# Patient Record
Sex: FEMALE | Race: WHITE | NOT HISPANIC OR LATINO | URBAN - METROPOLITAN AREA
[De-identification: names, ages, dates, MRNs, and addresses within clinical notes are randomized per-mention and may not be internally consistent; named-entity substitution may affect disease eponyms.]

---

## 2018-07-09 ENCOUNTER — EMERGENCY (EMERGENCY)
Facility: HOSPITAL | Age: 28
LOS: 0 days | Discharge: HOME | End: 2018-07-10
Attending: EMERGENCY MEDICINE | Admitting: EMERGENCY MEDICINE
Payer: OTHER MISCELLANEOUS

## 2018-07-09 VITALS
OXYGEN SATURATION: 100 % | DIASTOLIC BLOOD PRESSURE: 103 MMHG | HEART RATE: 113 BPM | WEIGHT: 134.92 LBS | SYSTOLIC BLOOD PRESSURE: 137 MMHG | RESPIRATION RATE: 17 BRPM | TEMPERATURE: 98 F | HEIGHT: 62 IN

## 2018-07-09 DIAGNOSIS — Z23 ENCOUNTER FOR IMMUNIZATION: ICD-10-CM
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
## 2018-07-09 DIAGNOSIS — S61.230A PUNCTURE WOUND WITHOUT FOREIGN BODY OF RIGHT INDEX FINGER WITHOUT DAMAGE TO NAIL, INITIAL ENCOUNTER: ICD-10-CM
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
## 2018-07-09 PROCEDURE — 99284 EMERGENCY DEPT VISIT MOD MDM: CPT

## 2018-07-09 RX ORDER — RABIES VACC, HUMAN DIPLOID/PF 2.5 UNIT
1 VIAL (EA) INTRAMUSCULAR ONCE
Qty: 0 | Refills: 0 | Status: COMPLETED | OUTPATIENT
Start: 2018-07-09 | End: 2018-07-09

## 2018-07-09 RX ORDER — HUMAN RABIES VIRUS IMMUNE GLOBULIN 150 [IU]/ML
1200 INJECTION, SOLUTION INTRAMUSCULAR ONCE
Qty: 0 | Refills: 0 | Status: COMPLETED | OUTPATIENT
Start: 2018-07-09 | End: 2018-07-09

## 2018-07-09 RX ADMIN — HUMAN RABIES VIRUS IMMUNE GLOBULIN 1200 UNIT(S): 150 INJECTION, SOLUTION INTRAMUSCULAR at 22:56

## 2018-07-09 RX ADMIN — Medication 1 MILLILITER(S): at 22:50

## 2018-07-09 NOTE — ED PROVIDER NOTE - OBJECTIVE STATEMENT
28 y/o F presents to the ED c/o numbness to R second digit near puncture wound s/p R bite by a Pitbull dog. Pt with no confirmation of dog rabies prophylaxis. Patient works at a [employer] and was bit by a Pitbull on 7/4/18. Two days later saw MD at  who prescribed ABx and D/C. Pt currently on ABx. Today patient took her band aid off and observed numbness. Denies fever, chills, sweats. Brother at bedside. No other neurological findings.

## 2018-07-09 NOTE — ED PROVIDER NOTE - PROGRESS NOTE DETAILS
JW PT given rabies prophylaxis and is UTD on tetanus. Pt instructed to continue ABX no sign of infection today. Pt given instructions on additional prophylaxis and instructed to follow up with hand surgeon in the next 24 hours.  I reviewed the alarm symptoms of this patient's diagnosis and discussed criteria for their return to the emergency department.  I instructed the patient to return to the emergency department with any alarm symptoms for their specific diagnosis including weakness, numbness, vision changes, neurological symptoms, any worsening symptoms, and any other concerns.  I instructed this patient to call their primary doctor today, to inform them of their visit to the emergency department, and to obtain a repeat evaluation in the next 24 hours.  This patient understood and agreed with our plan for follow up.  At the time of discharge this patient remained in stable condition, in no acute distress, with stable vital signs. JW Case reported to [facility]. JW PT has unreliable follow up questionable PMD primary medical home is urgent care or ED.  Unknown dog, unclear vaccination status, questionable availability of medical records.  Pt presents with hand numbness near the area of injury likely local nerve damage.  No snuff box tenderness, full ROM no TTP.  Neurovascularly intact.  No signs of infection SIRS 0+.  PT given rabies prophylaxis and is UTD on tetanus. Pt instructed to continue ABX no sign of infection today. Pt given instructions on additional prophylaxis and instructed to follow up with hand surgeon in the next 24 hours.  I reviewed the alarm symptoms of this patient's diagnosis and discussed criteria for their return to the emergency department.  I instructed the patient to return to the emergency department with any alarm symptoms for their specific diagnosis including weakness, numbness, vision changes, neurological symptoms, any worsening symptoms, and any other concerns.  I instructed this patient to call their primary doctor today, to inform them of their visit to the emergency department, and to obtain a repeat evaluation in the next 24 hours.  This patient understood and agreed with our plan for follow up.  At the time of discharge this patient remained in stable condition, in no acute distress, with stable vital signs. JW Case reported to [facility] who will follow up.

## 2018-07-09 NOTE — ED ADULT NURSE NOTE - OBJECTIVE STATEMENT
Pt states x 1 week ago was bitten by dog at work.  Pt states "I was told the dog had his rabies vaccine".  Pt does not have documentation at this time.  Pt states she went to urgent care x days ago and was told "wound looks fine" and given antibiotics and sent home, was not given rabies vaccine.  Pt states today developed numbness to hand.  States "I cannot feel anything from pointer finger to thumb".  VSS, no other symptoms

## 2018-07-09 NOTE — ED ADULT NURSE NOTE - CHPI ED SYMPTOMS NEG
no bleeding at site/no pain/no chills/no red streaks/no bleeding/no purulent drainage/no redness/no vomiting/no fever/no drainage

## 2018-07-09 NOTE — ED PROVIDER NOTE - PHYSICAL EXAMINATION
Left hand: 1cm well healed puncture wound above the 2nd carpal bone.  Mild numbness 1cm immediately distal to this wound.  No snuff box tenderness.  No erythema, drainage, gas, crepitus.  Full ROM of all 5 digits.  Otherwise normal sensory and motor function. 2+radial pulses with excellent capillary refill.

## 2018-07-09 NOTE — ED PROVIDER NOTE - NS_ ATTENDINGSCRIBEDETAILS _ED_A_ED_FT
The scribe's documentation has been prepared under my direction and personally reviewed by me in its entirety.  I confirm that the note above accurately reflects all my work, treatment, procedures, and decision making except where otherwise noted or amended by me.  Migel Crandall M.D.

## 2018-07-09 NOTE — ED PROVIDER NOTE - NEUROLOGICAL, MLM
Alert and oriented, no focal deficits, no motor or sensory deficits. No other neurological findings.

## 2018-07-09 NOTE — ED PROVIDER NOTE - MEDICAL DECISION MAKING DETAILS
28 y/o F presents to the ED c/o numbness to R second digit near puncture wound s/p R bite by a Pitbull dog plan for vaccine, rabies immune globulin update.

## 2018-07-16 ENCOUNTER — EMERGENCY (EMERGENCY)
Facility: HOSPITAL | Age: 28
LOS: 0 days | Discharge: HOME | End: 2018-07-16
Attending: EMERGENCY MEDICINE | Admitting: EMERGENCY MEDICINE
Payer: OTHER MISCELLANEOUS

## 2018-07-16 VITALS
OXYGEN SATURATION: 99 % | TEMPERATURE: 98 F | DIASTOLIC BLOOD PRESSURE: 81 MMHG | SYSTOLIC BLOOD PRESSURE: 138 MMHG | RESPIRATION RATE: 16 BRPM | HEART RATE: 90 BPM

## 2018-07-16 VITALS
HEART RATE: 90 BPM | OXYGEN SATURATION: 100 % | TEMPERATURE: 98 F | HEIGHT: 67 IN | SYSTOLIC BLOOD PRESSURE: 141 MMHG | WEIGHT: 130.07 LBS | RESPIRATION RATE: 18 BRPM | DIASTOLIC BLOOD PRESSURE: 85 MMHG

## 2018-07-16 DIAGNOSIS — S61.451D OPEN BITE OF RIGHT HAND, SUBSEQUENT ENCOUNTER: ICD-10-CM
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
## 2018-07-16 PROCEDURE — 99283 EMERGENCY DEPT VISIT LOW MDM: CPT

## 2018-07-16 RX ORDER — RABIES VACC, HUMAN DIPLOID/PF 2.5 UNIT
1 VIAL (EA) INTRAMUSCULAR ONCE
Qty: 0 | Refills: 0 | Status: DISCONTINUED | OUTPATIENT
Start: 2018-07-16 | End: 2018-07-16

## 2018-07-16 NOTE — ED ADULT NURSE NOTE - CHIEF COMPLAINT QUOTE
Patient was seen in ED 6 days ago status post a dog bite to her right hand. Received 1st dose rabies immunoglobulin and vaccine in [facility] then. Patient returning to ED for follow up rabies shot.

## 2018-07-16 NOTE — ED PROVIDER NOTE - OBJECTIVE STATEMENT
Pt. is a 26 yo F s/p 1st dose of rabies vaccine 6 days ago after exposure to dog bite now presenting for next shot.  Pt. unsure if she wants shot because now she has animal's immunizations records and dog was up to date on rabies vaccine.  Pt. does not wish to continue with vaccine series if there is little to no risk.  Pt. has dog bite on right hand, finished antibiotics for dog bite, and has appointment with Dr. Antonio this week as she still has some residual pain and numbness in right hand. No fevers per patient and wound is almost completely healed.

## 2018-07-16 NOTE — ED ADULT TRIAGE NOTE - CHIEF COMPLAINT QUOTE
Patient was seen in ED 6 days ago status post a dog bite to her right hand. Received 1st dose rabies immunoglobulin and vaccine in [facility] then. Patient returning to ED for follow up rabies shot.

## 2018-07-16 NOTE — ED ADULT NURSE NOTE - OBJECTIVE STATEMENT
presents to the ED for repeat rabies vaccination. as per pt, she was bit by a dog and got treated 1 week ago. has now returned for repeat vaccination as instructed.

## 2018-07-16 NOTE — ED PROVIDER NOTE - MEDICAL DECISION MAKING DETAILS
Dog bite to hand; already treated with antibiotics and has appt with Dr. Antonio for sensation deficit already diagnosed.  Patient chooses to stop vaccine series for rabies as she now has documentation of dog's vaccines which are up to date.

## 2018-08-03 ENCOUNTER — EMERGENCY (EMERGENCY)
Facility: HOSPITAL | Age: 28
LOS: 0 days | Discharge: HOME | End: 2018-08-03
Attending: EMERGENCY MEDICINE | Admitting: EMERGENCY MEDICINE
Payer: OTHER MISCELLANEOUS

## 2018-08-03 VITALS
HEART RATE: 87 BPM | OXYGEN SATURATION: 100 % | RESPIRATION RATE: 17 BRPM | DIASTOLIC BLOOD PRESSURE: 76 MMHG | TEMPERATURE: 98 F | SYSTOLIC BLOOD PRESSURE: 118 MMHG

## 2018-08-03 VITALS — HEIGHT: 62 IN | WEIGHT: 130.07 LBS

## 2018-08-03 DIAGNOSIS — Z20.3 CONTACT WITH AND (SUSPECTED) EXPOSURE TO RABIES: ICD-10-CM

## 2018-08-03 PROCEDURE — 99283 EMERGENCY DEPT VISIT LOW MDM: CPT

## 2018-08-03 NOTE — ED STATDOCS - OBJECTIVE STATEMENT
26 y/o female with no relevant PMHx presents to the ED regarding rabies follow up. Pt was bit by a dos on 7/4/2018, received rabies shot 7/9/2018. Pt returned to [facility] for second rabies shot, but she learned that the dog was vaccinated for rabies, so she did not receive the second shot. The Department of Health has been calling to make sure that the pt received her shots, but she was advised that she did not need the rabies series. However the Department of Health states she needs the full rabies series. No fever, abd pain or any other acute complaints at this time.

## 2018-08-03 NOTE — ED STATDOCS - ATTENDING CONTRIBUTION TO CARE
I, Israel Tay MD,  performed the initial face to face bedside interview with this patient regarding history of present illness, review of symptoms and relevant past medical, social and family history.  I completed an independent physical examination.  I was the initial provider who evaluated this patient. I have signed out the follow up of any pending tests (i.e. labs, radiological studies) to the ACP.  I have communicated the patient’s plan of care and disposition with the ACP.

## 2018-08-03 NOTE — ED STATDOCS - PROGRESS NOTE DETAILS
28 yo female presents with rabies f/u. Pt was initially bit by a dog on 7/3 and reecived a rabies vaccine on 7/9. Pt returned for her rabies vaccine but had paperwork that the dog was vaccinated for rabies and was told she did not need the rest. Pt later called by [facility] and was told that she needed the vaccines. Will contact [facility] and discuss the case. -Joshua Becker PA-C [facility] [employer] called and will pass the call to the [facility] since it is after hours. -Joshua Becker PA-C Tried calling [facility] [employer] again but the line was busy. -Joshua Becker PA-C Spoke with Zenaida Junior from [facility]. States as long as she has the paperwork that the dog had the rabies vaccine then she would not need to get the series. Will fax the paperwork to [phone]. Pt was advised of the [facility] recommendation. -Joshua Becker PA-C Spoke with Zenaida Junior from [facility]. States as long as she has the paperwork that the dog had the rabies vaccine then she would not need to get the series. Based on [facility] opt received te vaccine on 6/18/18. Will fax the paperwork to [phone]. Pt was advised of the [facility] recommendation. -Joshua Becker PA-C

## 2018-08-03 NOTE — ED ADULT TRIAGE NOTE - CHIEF COMPLAINT QUOTE
got bit by a dog at work on 7/4/18, came in for evaluation on 7/9/18 and received Rabies shot. Pt presents to ED requesting for follow up rabies shot.

## 2018-08-03 NOTE — ED ADULT NURSE NOTE - NSIMPLEMENTINTERV_GEN_ALL_ED
Implemented All Universal Safety Interventions:  [place] to call system. Call bell, personal items and telephone within reach. Instruct patient to call for assistance. Room bathroom lighting operational. Non-slip footwear when patient is off stretcher. Physically safe environment: no spills, clutter or unnecessary equipment. Stretcher in lowest position, wheels locked, appropriate side rails in place.

## 2018-08-03 NOTE — ED ADULT NURSE REASSESSMENT NOTE - NS ED NURSE REASSESS COMMENT FT1
Updated patient to plan of care. Awaiting return call from [facility] to see if patient needs rabies vaccination.

## 2018-08-03 NOTE — ED STATDOCS - NS_ ATTENDINGSCRIBEDETAILS _ED_A_ED_FT
I, Israel Tay MD,  performed the initial face to face bedside interview with this patient regarding history of present illness, review of symptoms and relevant past medical, social and family history.  I completed an independent physical examination.    The history, relevant review of systems, past medical and surgical history, medical decision making, and physical examination was documented by the scribe in my presence and I attest to the accuracy of the documentation.

## 2018-08-03 NOTE — ED ADULT NURSE NOTE - OBJECTIVE STATEMENT
pt states bit by vaccinated dog (within the last week) to right hand, occurred on July 4th, pt states she started rabies vaccine on July 9th here at [place], c/o numbness to right index finger, has been seeing a hand specialist, pt states dept of health of [facility] states she needed to complete vaccine

## 2018-08-07 LAB — RABV AB SPEC-ACNC: SIGNIFICANT CHANGE UP IU/ML

## 2018-12-11 NOTE — ED PROVIDER NOTE - NEUROLOGICAL SENSATION
Reminded pt that we need a stool sample at this time.      Damon Peterson  12/10/18 1920    
Report to DYLON Mccarty, pt resting quietly, visiting with  at bedside, iv infusing without any difficulty at this time, pt ask about diabetes, states she was diagnosed years ago but chose to stop taking po med for it because she didn't want to go to dr's anymore, pt is noncompliant with diet, just finished eating a jacob's bar and states she does not follow a diabetic diet, provider notified as well, pt alert, oriented, nad noted at this time     Callie Vides RN  12/10/18 1944    
present and normal in 4 extremities

## 2020-04-26 ENCOUNTER — MESSAGE (OUTPATIENT)
Age: 30
End: 2020-04-26

## 2020-05-02 LAB
SARS-COV-2 IGG SERPL IA-ACNC: <0.1 INDEX
SARS-COV-2 IGG SERPL QL IA: NEGATIVE

## 2021-11-29 NOTE — ED ADULT NURSE NOTE - CINV DISCH TEACH PARTICIP
Jose presents today for her OB visit. She reports dizziness x4-5 days. She also reports lower back pain.    Patient would like communication of their results via: [employer]    Patient's current [facility] status: Active.     [facility] - Patient is on [employer] Scheduling.           Patient

## 2022-01-06 ENCOUNTER — OUTPATIENT (OUTPATIENT)
Dept: OUTPATIENT SERVICES | Facility: HOSPITAL | Age: 32
LOS: 1 days | End: 2022-01-06
Payer: COMMERCIAL

## 2022-01-06 DIAGNOSIS — Z20.828 CONTACT WITH AND (SUSPECTED) EXPOSURE TO OTHER VIRAL COMMUNICABLE DISEASES: ICD-10-CM

## 2022-01-06 PROCEDURE — U0005: CPT

## 2022-01-06 PROCEDURE — U0003: CPT

## 2022-01-07 DIAGNOSIS — Z20.828 CONTACT WITH AND (SUSPECTED) EXPOSURE TO OTHER VIRAL COMMUNICABLE DISEASES: ICD-10-CM

## 2023-01-11 NOTE — ED STATDOCS - DATE/TIME 4
Addended by: NAKITA DOWLING on: 1/11/2023 01:13 PM     Modules accepted: Orders     03-Aug-2018 23:05

## 2023-04-12 ENCOUNTER — NON-APPOINTMENT (OUTPATIENT)
Age: 33
End: 2023-04-12

## 2023-08-11 NOTE — ED ADULT TRIAGE NOTE - WEIGHT IN KG
59 Transposition Flap Text: The defect edges were debeveled with a #15 scalpel blade.  Given the location of the defect and the proximity to free margins a transposition flap was deemed most appropriate.  Using a sterile surgical marker, an appropriate transposition flap was drawn incorporating the defect.    The area thus outlined was incised deep to adipose tissue with a #15 scalpel blade.  The skin margins were undermined to an appropriate distance in all directions utilizing iris scissors.

## 2024-06-07 PROBLEM — Z00.00 ENCOUNTER FOR PREVENTIVE HEALTH EXAMINATION: Status: ACTIVE | Noted: 2024-06-07

## 2024-06-12 ENCOUNTER — APPOINTMENT (OUTPATIENT)
Dept: INTERNAL MEDICINE | Facility: CLINIC | Age: 34
End: 2024-06-12
Payer: SELF-PAY

## 2024-06-12 VITALS
TEMPERATURE: 97.8 F | DIASTOLIC BLOOD PRESSURE: 89 MMHG | OXYGEN SATURATION: 96 % | BODY MASS INDEX: 24.92 KG/M2 | SYSTOLIC BLOOD PRESSURE: 125 MMHG | HEIGHT: 61 IN | RESPIRATION RATE: 16 BRPM | WEIGHT: 132 LBS | HEART RATE: 96 BPM

## 2024-06-12 DIAGNOSIS — Z23 ENCOUNTER FOR IMMUNIZATION: ICD-10-CM

## 2024-06-12 DIAGNOSIS — Z13.31 ENCOUNTER FOR SCREENING FOR DEPRESSION: ICD-10-CM

## 2024-06-12 DIAGNOSIS — Z00.00 ENCOUNTER FOR GENERAL ADULT MEDICAL EXAMINATION W/OUT ABNORMAL FINDINGS: ICD-10-CM

## 2024-06-12 DIAGNOSIS — R01.1 CARDIAC MURMUR, UNSPECIFIED: ICD-10-CM

## 2024-06-12 PROCEDURE — 99385 PREV VISIT NEW AGE 18-39: CPT | Mod: 25

## 2024-06-12 PROCEDURE — G0444 DEPRESSION SCREEN ANNUAL: CPT | Mod: 59

## 2024-06-12 PROCEDURE — 90471 IMMUNIZATION ADMIN: CPT

## 2024-06-12 PROCEDURE — 36415 COLL VENOUS BLD VENIPUNCTURE: CPT

## 2024-06-12 PROCEDURE — 90715 TDAP VACCINE 7 YRS/> IM: CPT

## 2024-06-12 NOTE — HISTORY OF PRESENT ILLNESS
[FreeTextEntry1] : establish care wellness exam [de-identified] : Ms. DRU AUSTIN is a 33 year female Pt. have no known medical history. Pt. is overall feeling well. No change in weight. No change in bowel and bladder habits. No trouble sleeping at night.

## 2024-06-12 NOTE — ASSESSMENT
[FreeTextEntry1] : Annual: Ordered comprehensive blood work , screen for hyperlipidemia , diabetes and thyroid disorder. labs drawn in the office. The results will be reviewed, and any abnormalities will be addressed accordingly. she is to appt. for  PAP  Received   flu vaccine Received Tdap vaccine today Received  COVID vaccine Declined for HIV and Hep C  screening test. alcohol screening :SIBRT:0 Depression screening:PHQ2:0    Recommended: Being physically active Maintaining a healthy weight Eating a diet rich in fruits, vegetables, whole grains, and low in saturated/trans fat Avoiding excess sun exposure.using sunscreen. .

## 2024-06-12 NOTE — HEALTH RISK ASSESSMENT
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

## 2024-06-13 ENCOUNTER — APPOINTMENT (OUTPATIENT)
Dept: GASTROENTEROLOGY | Facility: CLINIC | Age: 34
End: 2024-06-13
Payer: COMMERCIAL

## 2024-06-13 VITALS
WEIGHT: 131 LBS | BODY MASS INDEX: 24.73 KG/M2 | HEART RATE: 92 BPM | HEIGHT: 61 IN | DIASTOLIC BLOOD PRESSURE: 85 MMHG | OXYGEN SATURATION: 96 % | SYSTOLIC BLOOD PRESSURE: 128 MMHG

## 2024-06-13 DIAGNOSIS — Z80.0 FAMILY HISTORY OF MALIGNANT NEOPLASM OF DIGESTIVE ORGANS: ICD-10-CM

## 2024-06-13 DIAGNOSIS — Z78.9 OTHER SPECIFIED HEALTH STATUS: ICD-10-CM

## 2024-06-13 DIAGNOSIS — Z76.89 PERSONS ENCOUNTERING HEALTH SERVICES IN OTHER SPECIFIED CIRCUMSTANCES: ICD-10-CM

## 2024-06-13 DIAGNOSIS — Z86.79 PERSONAL HISTORY OF OTHER DISEASES OF THE CIRCULATORY SYSTEM: ICD-10-CM

## 2024-06-13 PROCEDURE — 99204 OFFICE O/P NEW MOD 45 MIN: CPT

## 2024-06-13 NOTE — HISTORY OF PRESENT ILLNESS
[FreeTextEntry1] : 32 y/o woman without any major medical problems, heart murmur, who presents to establish care because of family history of GI cancers.   Every once in a blue moon will diarrhea.  Says she as a child that she has an "extra piece of skin in anus " -still there.   Patient denies having abdominal pain, constipation, loss of appetite, weight loss, melena and hematochezia.    Patient denies having heartburn, regurgitation, difficulty swallowing, painful swallowing, nausea, vomiting.  Maternal grandfather had colon cancer in his 60s possibly.  Maternal grandmother had pancreatic cancer in her 80s. Paternal grandmother had pancreatic cancer at age 45.   Patient denies family history of colon polyp and other gastrointestinal cancers.  on occasion neck pain. All other review of systems are negative.  Denies cardiac symptoms   On Birth control pills.

## 2024-06-13 NOTE — ASSESSMENT
[FreeTextEntry1] : IMPRESSION: #  Establish care because of family history of GI cancers. -  Never had EGD/colonoscopy -  Never had genetic testing  -  Says she as a child that she has an "extra piece of skin in anus " -still there.  #  Family history of GI cancers -  Maternal grandfather had colon cancer in his 60s possibly. -  Maternal grandmother had pancreatic cancer in her 80s. -  Paternal grandmother had pancreatic cancer at age 45.    PLAN:  Discussed screening for pancreatic cancer (fasting glucose and yearly EUS alternating with MRI).  Patients who qualify are patients with two family members with pancreatic cancer with one member being first degree relative or patients with Peutz-Jegher's Syndrome, or abnormal genetics (Germline CDKN2A mutation (highest risk of pancreatic cancer), or carriers of a germline mutation BRCA2, BRCA1, PALB2, ASHLY, No syndrome (MLH1, MSH2, or MSH6 gene mutation) with at least one affected first degree relative).  Whom and when to screen for pancreatic cancer STK11 -> screen at age 30 CDKN2A -> screen at age 35 BRCA1, 2 , PALB2 -> screen at age 50 or 10 yrs younger than affected family member with pancreatic cancer. ASHLY -> with FDR or second degree relative with cancer -> screen at age 50 or 10 yrs younger No -> with FDR or second degree relative with pancreatic cancer -> screen at age 50 or 10 yrs younger   Contact information given -   Discussed timing of screening colonoscopy - 10 yrs from youngest age digestive cancer on the same family tree.  She will follow-up with me as needed or symptoms that are concerning that were reviewed.

## 2024-06-13 NOTE — PHYSICAL EXAM
[Normal] : heart rate was normal and rhythm regular, normal S1 and S2, no murmurs [Bowel Sounds] : normal bowel sounds [Abdomen Tenderness] : non-tender [No Rectal Mass] : no rectal mass [Chaperone Declined] : chaperone declined [de-identified] : small anal skin tags, small nonthrombosed external hemorrhoids.  Brown stool

## 2024-06-14 LAB
ALBUMIN SERPL ELPH-MCNC: 4.5 G/DL
ALP BLD-CCNC: 59 U/L
ALT SERPL-CCNC: 6 U/L
ANION GAP SERPL CALC-SCNC: 13 MMOL/L
APPEARANCE: CLEAR
AST SERPL-CCNC: 11 U/L
BACTERIA: NEGATIVE /HPF
BASOPHILS # BLD AUTO: 0.03 K/UL
BASOPHILS NFR BLD AUTO: 0.4 %
BILIRUB SERPL-MCNC: 0.5 MG/DL
BILIRUBIN URINE: NEGATIVE
BLOOD URINE: NEGATIVE
BUN SERPL-MCNC: 9 MG/DL
CALCIUM SERPL-MCNC: 9.2 MG/DL
CAST: 0 /LPF
CHLORIDE SERPL-SCNC: 103 MMOL/L
CHOLEST SERPL-MCNC: 170 MG/DL
CO2 SERPL-SCNC: 21 MMOL/L
COLOR: YELLOW
CREAT SERPL-MCNC: 0.93 MG/DL
EGFR: 84 ML/MIN/1.73M2
EOSINOPHIL # BLD AUTO: 0.2 K/UL
EOSINOPHIL NFR BLD AUTO: 2.8 %
EPITHELIAL CELLS: 4 /HPF
ESTIMATED AVERAGE GLUCOSE: 105 MG/DL
GLUCOSE QUALITATIVE U: NEGATIVE MG/DL
GLUCOSE SERPL-MCNC: 83 MG/DL
HBA1C MFR BLD HPLC: 5.3 %
HCT VFR BLD CALC: 36.4 %
HDLC SERPL-MCNC: 48 MG/DL
HGB BLD-MCNC: 11 G/DL
IMM GRANULOCYTES NFR BLD AUTO: 0.3 %
KETONES URINE: 15 MG/DL
LDLC SERPL CALC-MCNC: 106 MG/DL
LEUKOCYTE ESTERASE URINE: ABNORMAL
LYMPHOCYTES # BLD AUTO: 2.08 K/UL
LYMPHOCYTES NFR BLD AUTO: 29.5 %
MAN DIFF?: NORMAL
MCHC RBC-ENTMCNC: 22.5 PG
MCHC RBC-ENTMCNC: 30.2 GM/DL
MCV RBC AUTO: 74.4 FL
MICROSCOPIC-UA: NORMAL
MONOCYTES # BLD AUTO: 0.34 K/UL
MONOCYTES NFR BLD AUTO: 4.8 %
NEUTROPHILS # BLD AUTO: 4.38 K/UL
NEUTROPHILS NFR BLD AUTO: 62.2 %
NITRITE URINE: NEGATIVE
NONHDLC SERPL-MCNC: 122 MG/DL
PH URINE: 5.5
PLATELET # BLD AUTO: 496 K/UL
POTASSIUM SERPL-SCNC: 3.9 MMOL/L
PROT SERPL-MCNC: 7.3 G/DL
PROTEIN URINE: NORMAL MG/DL
RBC # BLD: 4.89 M/UL
RBC # FLD: 17.9 %
RED BLOOD CELLS URINE: 1 /HPF
REVIEW: NORMAL
SODIUM SERPL-SCNC: 137 MMOL/L
SPECIFIC GRAVITY URINE: 1.02
TRIGL SERPL-MCNC: 84 MG/DL
TSH SERPL-ACNC: 1.85 UIU/ML
UROBILINOGEN URINE: 1 MG/DL
WBC # FLD AUTO: 7.05 K/UL
WHITE BLOOD CELLS URINE: 2 /HPF

## 2024-09-26 ENCOUNTER — OUTPATIENT (OUTPATIENT)
Dept: OUTPATIENT SERVICES | Facility: HOSPITAL | Age: 34
LOS: 1 days | End: 2024-09-26
Payer: COMMERCIAL

## 2024-09-26 VITALS
DIASTOLIC BLOOD PRESSURE: 83 MMHG | HEIGHT: 62 IN | TEMPERATURE: 98 F | WEIGHT: 128.97 LBS | HEART RATE: 90 BPM | OXYGEN SATURATION: 98 % | SYSTOLIC BLOOD PRESSURE: 130 MMHG | RESPIRATION RATE: 16 BRPM

## 2024-09-26 DIAGNOSIS — Z01.818 ENCOUNTER FOR OTHER PREPROCEDURAL EXAMINATION: ICD-10-CM

## 2024-09-26 DIAGNOSIS — Z30.2 ENCOUNTER FOR STERILIZATION: ICD-10-CM

## 2024-09-26 LAB
ANION GAP SERPL CALC-SCNC: 13 MMOL/L — SIGNIFICANT CHANGE UP (ref 5–17)
BUN SERPL-MCNC: 9 MG/DL — SIGNIFICANT CHANGE UP (ref 7–23)
CALCIUM SERPL-MCNC: 9.7 MG/DL — SIGNIFICANT CHANGE UP (ref 8.4–10.5)
CHLORIDE SERPL-SCNC: 105 MMOL/L — SIGNIFICANT CHANGE UP (ref 96–108)
CO2 SERPL-SCNC: 21 MMOL/L — LOW (ref 22–31)
CREAT SERPL-MCNC: 0.84 MG/DL — SIGNIFICANT CHANGE UP (ref 0.5–1.3)
EGFR: 94 ML/MIN/1.73M2 — SIGNIFICANT CHANGE UP
GLUCOSE SERPL-MCNC: 75 MG/DL — SIGNIFICANT CHANGE UP (ref 70–99)
HCT VFR BLD CALC: 40.9 % — SIGNIFICANT CHANGE UP (ref 34.5–45)
HGB BLD-MCNC: 13.2 G/DL — SIGNIFICANT CHANGE UP (ref 11.5–15.5)
MCHC RBC-ENTMCNC: 28.2 PG — SIGNIFICANT CHANGE UP (ref 27–34)
MCHC RBC-ENTMCNC: 32.3 GM/DL — SIGNIFICANT CHANGE UP (ref 32–36)
MCV RBC AUTO: 87.4 FL — SIGNIFICANT CHANGE UP (ref 80–100)
NRBC # BLD: 0 /100 WBCS — SIGNIFICANT CHANGE UP (ref 0–0)
PLATELET # BLD AUTO: 422 K/UL — HIGH (ref 150–400)
POTASSIUM SERPL-MCNC: 4.6 MMOL/L — SIGNIFICANT CHANGE UP (ref 3.5–5.3)
POTASSIUM SERPL-SCNC: 4.6 MMOL/L — SIGNIFICANT CHANGE UP (ref 3.5–5.3)
RBC # BLD: 4.68 M/UL — SIGNIFICANT CHANGE UP (ref 3.8–5.2)
RBC # FLD: 15.6 % — HIGH (ref 10.3–14.5)
SODIUM SERPL-SCNC: 139 MMOL/L — SIGNIFICANT CHANGE UP (ref 135–145)
WBC # BLD: 5.46 K/UL — SIGNIFICANT CHANGE UP (ref 3.8–10.5)
WBC # FLD AUTO: 5.46 K/UL — SIGNIFICANT CHANGE UP (ref 3.8–10.5)

## 2024-09-26 RX ORDER — SODIUM CHLORIDE 0.9 % (FLUSH) 0.9 %
3 SYRINGE (ML) INJECTION EVERY 8 HOURS
Refills: 0 | Status: DISCONTINUED | OUTPATIENT
Start: 2024-10-15 | End: 2024-10-29

## 2024-09-26 RX ORDER — SODIUM CHLORIDE IRRIG SOLUTION 0.9 %
1000 SOLUTION, IRRIGATION IRRIGATION
Refills: 0 | Status: DISCONTINUED | OUTPATIENT
Start: 2024-10-15 | End: 2024-10-29

## 2024-09-26 NOTE — H&P PST ADULT - HISTORY OF PRESENT ILLNESS
34 year old nulliparous female  reports having removal of fallopian tube  , scheduled for bilateral salpingectomy on 10/15/24. 34 year old nulliparous female  with no significant PMH , reports having sterilization by removal of fallopian tubes  , scheduled for bilateral salpingectomy on 10/15/24.

## 2024-09-26 NOTE — H&P PST ADULT - ASSESSMENT
bilateral salpingectomy on 10/15/24.      Activity:   physically  active , walks alot, home chores, exercise 2-3x/week  Energy Expenditure score (DASI SCORE METS): 8.6  Symptoms : denies chest pain, palpitations, dyspnea, dizziness or  TELLEZ,     Airway: normal  Dental: Patient denies Loose teeth/Denture.

## 2024-10-14 RX ORDER — OXYCODONE HYDROCHLORIDE 30 MG/1
5 TABLET, FILM COATED, EXTENDED RELEASE ORAL ONCE
Refills: 0 | Status: DISCONTINUED | OUTPATIENT
Start: 2024-10-15 | End: 2024-10-15

## 2024-10-14 RX ORDER — FENTANYL CITRATE-0.9 % NACL/PF 300MCG/30
25 PATIENT CONTROLLED ANALGESIA VIAL INJECTION
Refills: 0 | Status: DISCONTINUED | OUTPATIENT
Start: 2024-10-15 | End: 2024-10-15

## 2024-10-14 RX ORDER — ONDANSETRON HCL/PF 4 MG/2 ML
4 VIAL (ML) INJECTION ONCE
Refills: 0 | Status: DISCONTINUED | OUTPATIENT
Start: 2024-10-15 | End: 2024-10-29

## 2024-10-15 ENCOUNTER — OUTPATIENT (OUTPATIENT)
Dept: OUTPATIENT SERVICES | Facility: HOSPITAL | Age: 34
LOS: 1 days | End: 2024-10-15
Payer: COMMERCIAL

## 2024-10-15 ENCOUNTER — TRANSCRIPTION ENCOUNTER (OUTPATIENT)
Age: 34
End: 2024-10-15

## 2024-10-15 VITALS
DIASTOLIC BLOOD PRESSURE: 88 MMHG | OXYGEN SATURATION: 100 % | RESPIRATION RATE: 18 BRPM | TEMPERATURE: 97 F | HEART RATE: 101 BPM | SYSTOLIC BLOOD PRESSURE: 128 MMHG

## 2024-10-15 VITALS
OXYGEN SATURATION: 100 % | HEART RATE: 85 BPM | DIASTOLIC BLOOD PRESSURE: 86 MMHG | SYSTOLIC BLOOD PRESSURE: 122 MMHG | RESPIRATION RATE: 15 BRPM

## 2024-10-15 DIAGNOSIS — Z30.2 ENCOUNTER FOR STERILIZATION: ICD-10-CM

## 2024-10-15 LAB
BLD GP AB SCN SERPL QL: NEGATIVE — SIGNIFICANT CHANGE UP
RH IG SCN BLD-IMP: POSITIVE — SIGNIFICANT CHANGE UP
RH IG SCN BLD-IMP: POSITIVE — SIGNIFICANT CHANGE UP

## 2024-10-15 PROCEDURE — 88302 TISSUE EXAM BY PATHOLOGIST: CPT

## 2024-10-15 PROCEDURE — 58661 LAPAROSCOPY REMOVE ADNEXA: CPT | Mod: 50

## 2024-10-15 PROCEDURE — 88302 TISSUE EXAM BY PATHOLOGIST: CPT | Mod: 26

## 2024-10-15 PROCEDURE — 86901 BLOOD TYPING SEROLOGIC RH(D): CPT

## 2024-10-15 PROCEDURE — C9399: CPT

## 2024-10-15 PROCEDURE — 86850 RBC ANTIBODY SCREEN: CPT

## 2024-10-15 PROCEDURE — 86900 BLOOD TYPING SEROLOGIC ABO: CPT

## 2024-10-15 RX ORDER — CHLORHEXIDINE GLUCONATE ORAL RINSE 1.2 MG/ML
1 SOLUTION DENTAL ONCE
Refills: 0 | Status: COMPLETED | OUTPATIENT
Start: 2024-10-15 | End: 2024-10-15

## 2024-10-15 RX ORDER — GABAPENTIN 800 MG/1
600 TABLET, FILM COATED ORAL ONCE
Refills: 0 | Status: COMPLETED | OUTPATIENT
Start: 2024-10-15 | End: 2024-10-15

## 2024-10-15 RX ORDER — CELECOXIB 200 MG/1
400 CAPSULE ORAL ONCE
Refills: 0 | Status: COMPLETED | OUTPATIENT
Start: 2024-10-15 | End: 2024-10-15

## 2024-10-15 RX ORDER — ACETAMINOPHEN 325 MG
2 TABLET ORAL
Qty: 0 | Refills: 0 | DISCHARGE

## 2024-10-15 RX ORDER — LIDOCAINE HCL 20 MG/ML
0.2 AMPUL (ML) INJECTION ONCE
Refills: 0 | Status: COMPLETED | OUTPATIENT
Start: 2024-10-15 | End: 2024-10-15

## 2024-10-15 RX ORDER — OXYCODONE HYDROCHLORIDE 30 MG/1
1 TABLET, FILM COATED, EXTENDED RELEASE ORAL
Qty: 0 | Refills: 0 | DISCHARGE

## 2024-10-15 RX ORDER — CEFAZOLIN SODIUM 1 G
2000 VIAL (EA) INJECTION ONCE
Refills: 0 | Status: COMPLETED | OUTPATIENT
Start: 2024-10-15 | End: 2024-10-15

## 2024-10-15 RX ORDER — ACETAMINOPHEN 325 MG
1000 TABLET ORAL ONCE
Refills: 0 | Status: COMPLETED | OUTPATIENT
Start: 2024-10-15 | End: 2024-10-15

## 2024-10-15 RX ADMIN — Medication 1000 MILLIGRAM(S): at 07:23

## 2024-10-15 RX ADMIN — Medication 100 MILLILITER(S): at 07:22

## 2024-10-15 RX ADMIN — CELECOXIB 400 MILLIGRAM(S): 200 CAPSULE ORAL at 07:23

## 2024-10-15 RX ADMIN — GABAPENTIN 600 MILLIGRAM(S): 800 TABLET, FILM COATED ORAL at 08:12

## 2024-10-15 RX ADMIN — Medication 3 MILLILITER(S): at 07:16

## 2024-10-15 RX ADMIN — CHLORHEXIDINE GLUCONATE ORAL RINSE 1 APPLICATION(S): 1.2 SOLUTION DENTAL at 07:24

## 2024-10-15 NOTE — ASU DISCHARGE PLAN (ADULT/PEDIATRIC) - PAIN MANAGEMENT
1. Have you been to the ER, urgent care clinic since your last visit? Hospitalized since your last visit? No    2. Have you seen or consulted any other health care providers outside of the [address] [place] [address] since your last visit? Include any pap smears or colon screening.  No Take over the counter pain medication/Prescriptions electronically submitted to pharmacy from doctor's office

## 2024-10-15 NOTE — ASU DISCHARGE PLAN (ADULT/PEDIATRIC) - CARE PROVIDER_API CALL
Dennise Walters  Obstetrics and Gynecology  [address], [address]  [place] [address]  Phone: [phone]  Fax: [phone]  Established Patient  Follow Up Time: 2 weeks

## 2024-10-15 NOTE — ASU DISCHARGE PLAN (ADULT/PEDIATRIC) - NS MD DC FALL RISK RISK
For information on Fall & Injury Prevention, visit: [email or web address] OR  [email or web address] OR  https://www.cdc.gov/steadi/patient.html

## 2024-10-15 NOTE — PACU DISCHARGE NOTE - HYDRATION STATUS:
- Call or seek medical attention for questions or concerns  - Follow up with Dr. Beltran as needed  - Follow up with Dr. Allen in 1-2 weeks time, avoid all blood thinners including aspirin or NSAIDs (ibuprophen, Advil, Aleve, Motrin)  - Follow up with primary care provider within one weeks time regarding pulmonary nodule and if shoulder pain persists  - Resume regular diet  - May take over the counter acetaminophen as needed for pain  - Continue daily over the counter stool softener while on narcotics  - No operation of machinery or motorized vehicles while under the influence of narcotics  - No alcohol, marijuana or illicit drug use while under the influence of narcotics  - No swimming, hot tubs, baths or wound submersion until cleared by outpatient provider. May shower  - No contact sports, strenuous activities, or heavy lifting until cleared by outpatient provider  - If respiratory decompensation, changes in mentation, or signs or symptoms of infection occur seek medical attention    Discharge Instructions    Discharged to home by car with self. Discharged via wheelchair, hospital escort: Yes.  Special equipment needed: Not Applicable    Be sure to schedule a follow-up appointment with your primary care doctor or any specialists as instructed.     Discharge Plan:   Smoking Cessation Offered: Patient Refused    I understand that a diet low in cholesterol, fat, and sodium is recommended for good health. Unless I have been given specific instructions below for another diet, I accept this instruction as my diet prescription.   Other diet: Diabetic    Special Instructions: None    · Is patient discharged on Warfarin / Coumadin?   No     Depression / Suicide Risk    As you are discharged from this [facility] facility, it is important to learn how to keep safe from harming yourself.    Recognize the warning signs:  · Abrupt changes in personality, positive or negative- including increase in energy   · Giving away  Satisfactory possessions  · Change in eating patterns- significant weight changes-  positive or negative  · Change in sleeping patterns- unable to sleep or sleeping all the time   · Unwillingness or inability to communicate  · Depression  · Unusual sadness, discouragement and loneliness  · Talk of wanting to die  · Neglect of personal appearance   · Rebelliousness- reckless behavior  · Withdrawal from people/activities they love  · Confusion- inability to concentrate     If you or a loved one observes any of these behaviors or has concerns about self-harm, here's what you can do:  · Talk about it- your feelings and reasons for harming yourself  · Remove any means that you might use to hurt yourself (examples: pills, rope, extension cords, firearm)  · Get professional help from the community (Mental Health, Substance Abuse, psychological counseling)  · Do not be alone:Call your Safe Contact- someone whom you trust who will be there for you.  · Call your local CRISIS HOTLINE 294-0319 or 741-319-2776  · Call your local Children's Mobile Crisis Response Team [place] (062) 496-7913 or wwwPopbasic  · Call the toll free National Suicide Prevention Hotlines   · National Suicide Prevention Lifeline 190-109-XARF (0073)  · National Hope Line Network 800-SUICIDE (477-3689)      Traumatic Brain Injury  Traumatic brain injury (TBI) is an injury to your brain from a blow to your head (closed injury) or an object penetrating your skull and entering your brain (open injury). The severity of TBI varies significantly from one person to the next. Some TBIs cause you to pass out (lose consciousness) immediately and for a long period of time. Other TBIs do not cause any loss of consciousness.  Symptoms of any type of TBI can be long lasting (chronic). TBI can interfere with memory and speech. TBI can also cause chronic symptoms like headache or dizziness.  What are the causes?  TBI is caused by a closed or open injury.  What increases  the risk?  You may be at higher risk for TBI if you:  · Are 75 or older.  · Are a man.  · Are in a car accident.  · Play a contact sport, especially football, hockey, or soccer.  · Do not wear protective gear while playing sports.  · Are in the .  · Are a victim of violence.  · Abuse drugs or alcohol.  · Have had a previous TBI.  What are the signs or symptoms?  Signs and symptoms of TBI may occur right away or not until days, weeks, or months after the injury. They may last for days, weeks, months, or years. Symptoms may include:  · Loss of consciousness.  · Headache.  · Confusion.  · Fatigue.  · Changes in sleep.  · Dizziness.  · Mood or personality changes.  · Memory problems.  · Nausea or vomiting or both.  · Seizures.  · Clumsiness.  · Slurred speech.  · Depression and anxiety.  · Anger.  · Inability to control emotions or actions (impulse control).  · Loss of or dulling of your senses, such as hearing, vision, and touch. This can include:  ¨ Blurred vision.  ¨ Ringing in your ears.  How is this diagnosed?  TBI may be diagnosed by a medical history and physical exam. Your health care provider will also do a neurologic exam to check your:  · Reflexes.  · Sensations.  · Alertness.  · Memory.  · Vision.  · Hearing.  · Coordination.  Your health care provider will also do tests to diagnose the extent of your TBI, such as a CT scan of your brain and skull. One way to determine the severity of your TBI is with a scoring system called the [place] Coma Scale (GCS). It measures eye opening, motor response, and verbal response. The higher the score, the milder the TBI.  Your TBI may be described as mild, moderate, or severe:  · Mild TBI (concussion).  ¨ Symptoms of mild TBI usually go away on their own. This can take weeks or months, depending on the type of concussion.  ¨ Your GCS will be 13-15.  ¨ Your brain CT scan will be normal.  ¨ You may or may not have a short hospital stay.  · Moderate TBI.  ¨ Your GCS  will be 9-12.  ¨ Your brain CT scan will be abnormal.  ¨ You will likely need a short hospital stay.  · Severe TBI.  ¨ Your GCS will be 3-8.  ¨ Your brain CT scan will be abnormal.  ¨ You may need a long stay in the hospital.  How is this treated?  Emergency treatment of TBI may involve measures to maintain a clear airway and stable blood pressure. Brain surgery may be needed to:  · Remove a blood clot.  · Repair bleeding.  · Remove an object that has penetrated the brain, such as a skull fragment or a bullet.  Other treatments depend on your chronic signs and symptoms. These treatments include the following types of therapy:  · Physical.  · Occupational.  · Speech and language.  · Mental health.  · Social support.  Follow these instructions at home:  · Carefully follow all your health care provider's instructions.  · Work closely with all your therapists, if necessary.  · Take medicines only as directed by your health care provider. Do not take aspirin or other anti-inflammatory medicines such as ibuprofen or naproxen unless approved by your health care provider.  · Do not abuse illegal drugs.  · Limit alcohol intake to no more than 1 drink per day for nonpregnant women and 2 drinks per day for men. One drink equals 12 ounces of beer, 5 ounces of wine, or 1½ ounces of hard liquor.  · Avoid any situation where there is potential for another head injury, such as football, hockey, soccer, basketball, martial arts, downhill snow sports, and horseback riding. Do not do these activities until your health care provider approves.  · Rest. Rest helps the brain to heal. Make sure you:  ¨ Get plenty of sleep at night. Avoid staying up late at night.  ¨ Keep the same bedtime hours on weekends and weekdays.  ¨ Rest during the day. Take daytime naps or rest breaks when you feel tired.  · Avoid excessive visual stimulation while recovering from a TBI. This includes work on the computer, watching TV, and reading.  · Try to avoid  activities that cause physical or mental stress. Stay home from work or school as directed by your health care provider.  · Make lists to plan your day and help your memory.  · Do not drive, ride a bicycle, or operate heavy machinery until your health care provider approves.  · Seek support from friends and family.  · Keep all follow-up visits as directed by your health care provider. This is important.  · Watch your symptoms and tell others to do the same. Complications sometimes occur after a TBI.  How is this prevented?  · Wear a helmet while biking, skiing, skateboarding, skating, or doing similar activities. Wear your seatbelt while driving.  · Do not abuse alcohol or drugs.  · Do not drink and drive.  · Prevent falls at home by:  ¨ Removing clutter and tripping hazards, including loose rugs, from floors and stairways.  ¨ Using grab bars in bathrooms and handrails by stairs.  ¨ Placing nonslip mats on floors and in bathtubs.  ¨ Improving lighting in dim areas.  Contact a health care provider if:  Seek medical care if you have any of the following symptoms for more than two weeks after your injury:  · Chronic headaches.  · Dizziness or balance problems.  · Nausea.  · Vision problems.  · Increased sensitivity to noise or light.  · Depression or mood swings.  · Anxiety or irritability.  · Memory problems.  · Difficulty concentrating or paying attention.  · Sleep problems.  · Feeling tired all the time.  Get help right away if:  · You have confusion or unusual drowsiness.  · It is difficult to wake you up.  · You have nausea or persistent, forceful vomiting.  · You feel like you are moving when you are not (vertigo). Your eyes may move rapidly back and forth.  · You have convulsions or faint.  · You have severe, persistent headaches that are not relieved by medicine.  · You cannot use your arms or legs normally.  · One of your pupils is larger than the other.  · You have clear or bloody discharge from your nose or  ears.  · Your problems are getting worse, not better.  This information is not intended to replace advice given to you by your health care provider. Make sure you discuss any questions you have with your health care provider.  Document Released: 12/08/2003 Document Revised: 08/20/2017 Document Reviewed: 04/02/2015  [employer]vier Interactive Patient Education © 2017 [employer]

## 2024-10-15 NOTE — BRIEF OPERATIVE NOTE - OPERATION/FINDINGS
On EUA grossly normal external genitalia.   Pedunculated 2cm fibroid on posterior aspect of the uterus   Endometriosis lesion on R uterosacral ligament  Good hemostasis at end of procedure    On EUA grossly normal external genitalia.   On laparoscopy, pedunculated 2cm fibroid on posterior aspect of the uterus   Endometriosis lesion on R uterosacral ligament  Liver edge, gallbladder and falciform ligament wnl  Good hemostasis at end of procedure

## 2024-10-16 PROCEDURE — G0463: CPT

## 2024-10-17 LAB — SURGICAL PATHOLOGY STUDY: SIGNIFICANT CHANGE UP

## 2025-01-09 ENCOUNTER — NON-APPOINTMENT (OUTPATIENT)
Age: 35
End: 2025-01-09

## 2025-04-26 ENCOUNTER — NON-APPOINTMENT (OUTPATIENT)
Age: 35
End: 2025-04-26

## (undated) DEVICE — WARMING BLANKET UPPER ADULT

## (undated) DEVICE — DRSG OPSITE 2.5 X 2"

## (undated) DEVICE — VALVE YELLOW PORT SEAL PLUS 5MM

## (undated) DEVICE — DRAPE INSTRUMENT POUCH 6.75" X 11"

## (undated) DEVICE — VENODYNE/SCD SLEEVE CALF MEDIUM

## (undated) DEVICE — FOLEY TRAY 16FR 5CC LF BAG CLOSED

## (undated) DEVICE — SOL INJ NS 0.9% 500ML 2 PORT

## (undated) DEVICE — UTERINE MANIPULATOR COOPER SURGICAL 5MM 33CM GREEN

## (undated) DEVICE — SUT MONOCRYL 4-0 27" PS-2 UNDYED

## (undated) DEVICE — DRSG DERMABOND 0.7ML

## (undated) DEVICE — DRAPE TOWEL BLUE 17" X 24"

## (undated) DEVICE — SYR LUER LOK 10CC

## (undated) DEVICE — INSUFFLATION NDL ETHICON ENDOPATH VERESS 14G 120MM

## (undated) DEVICE — NDL HYPO REGULAR BEVEL 25G X 1.5" (BLUE)

## (undated) DEVICE — LIGASURE BLUNT TIP 37CM

## (undated) DEVICE — PACK GYN LAPAROSCOPY

## (undated) DEVICE — BLADE SCALPEL SAFETYLOCK #15

## (undated) DEVICE — SOL IRR POUR NS 0.9% 1000ML

## (undated) DEVICE — DRSG STERISTRIPS 0.5 X 4"

## (undated) DEVICE — DRAPE 1/2 SHEET 40X57"

## (undated) DEVICE — DRSG MASTISOL

## (undated) DEVICE — TUBING INSUFFLATION LAP FILTER 10FT

## (undated) DEVICE — DRAPE TOWEL BLUE STICKY

## (undated) DEVICE — TUBING IRRIGATION DAVOL SYSTEM X STREAM

## (undated) DEVICE — GLV 7 PROTEXIS (WHITE)

## (undated) DEVICE — DRAPE MAYO STAND 30"

## (undated) DEVICE — FOLEY TRAY 16FR LF URINE METER SURESTEP